# Patient Record
Sex: MALE | Race: WHITE | URBAN - METROPOLITAN AREA
[De-identification: names, ages, dates, MRNs, and addresses within clinical notes are randomized per-mention and may not be internally consistent; named-entity substitution may affect disease eponyms.]

---

## 2017-03-20 ENCOUNTER — OP HISTORICAL/CONVERTED ENCOUNTER (OUTPATIENT)
Dept: OTHER | Age: 47
End: 2017-03-20

## 2024-12-26 PROBLEM — R39.198 DIFFICULTY VOIDING: Status: ACTIVE | Noted: 2024-12-26

## 2025-01-30 ENCOUNTER — OFFICE VISIT (OUTPATIENT)
Age: 55
End: 2025-01-30
Payer: COMMERCIAL

## 2025-01-30 VITALS — HEART RATE: 62 BPM | HEIGHT: 69 IN | SYSTOLIC BLOOD PRESSURE: 142 MMHG | DIASTOLIC BLOOD PRESSURE: 88 MMHG

## 2025-01-30 DIAGNOSIS — R39.198 DIFFICULTY VOIDING: Primary | ICD-10-CM

## 2025-01-30 LAB
BILIRUBIN, URINE, POC: NEGATIVE
BLOOD URINE, POC: NEGATIVE
GLUCOSE URINE, POC: NEGATIVE
KETONES, URINE, POC: NEGATIVE
LEUKOCYTE ESTERASE, URINE, POC: NEGATIVE
NITRITE, URINE, POC: NEGATIVE
PH, URINE, POC: 7 (ref 4.6–8)
PROTEIN,URINE, POC: NEGATIVE
SPECIFIC GRAVITY, URINE, POC: 1.02 (ref 1–1.03)
URINALYSIS CLARITY, POC: CLEAR
URINALYSIS COLOR, POC: YELLOW
UROBILINOGEN, POC: NORMAL

## 2025-01-30 PROCEDURE — 99204 OFFICE O/P NEW MOD 45 MIN: CPT | Performed by: STUDENT IN AN ORGANIZED HEALTH CARE EDUCATION/TRAINING PROGRAM

## 2025-01-30 PROCEDURE — 81003 URINALYSIS AUTO W/O SCOPE: CPT | Performed by: STUDENT IN AN ORGANIZED HEALTH CARE EDUCATION/TRAINING PROGRAM

## 2025-01-30 RX ORDER — FAMOTIDINE 40 MG/1
40 TABLET, FILM COATED ORAL DAILY
COMMUNITY

## 2025-01-30 RX ORDER — FLUTICASONE PROPIONATE 50 MCG
1 SPRAY, SUSPENSION (ML) NASAL 2 TIMES DAILY PRN
COMMUNITY

## 2025-01-30 RX ORDER — POLYETHYLENE GLYCOL 3350 17 G/17G
17 POWDER, FOR SOLUTION ORAL DAILY
COMMUNITY

## 2025-01-30 RX ORDER — CALCIUM CARBONATE 500 MG/1
TABLET, CHEWABLE ORAL 4 TIMES DAILY PRN
COMMUNITY

## 2025-01-30 RX ORDER — CLOBETASOL PROPIONATE 0.5 MG/G
OINTMENT TOPICAL 2 TIMES DAILY PRN
COMMUNITY

## 2025-01-30 RX ORDER — ACETAMINOPHEN 325 MG/1
TABLET ORAL EVERY 6 HOURS PRN
COMMUNITY

## 2025-01-30 RX ORDER — LORATADINE 10 MG/1
10 TABLET ORAL DAILY
COMMUNITY

## 2025-01-30 RX ORDER — OXYBUTYNIN CHLORIDE 5 MG/1
5 TABLET, EXTENDED RELEASE ORAL DAILY
Qty: 30 TABLET | Refills: 3 | Status: SHIPPED | OUTPATIENT
Start: 2025-01-30

## 2025-01-30 RX ORDER — IBUPROFEN 200 MG
600 TABLET ORAL EVERY 6 HOURS PRN
COMMUNITY

## 2025-01-30 RX ORDER — SUMATRIPTAN SUCCINATE 100 MG/1
100 TABLET ORAL
COMMUNITY

## 2025-01-30 NOTE — ASSESSMENT & PLAN NOTE
Chronic, not at goal.  Tried Flomax previously without success.  Patient most bothered by frequency and urgency.  We will try medication for overactive bladder.  Use 5 mg oxybutynin once daily in the morning.  Follow-up in 6 to 8 weeks to review symptoms.

## 2025-01-30 NOTE — PROGRESS NOTES
Chief Complaint   Patient presents with    New Patient     Urinary frequency      1. Have you been to the ER, urgent care clinic since your last visit?  Hospitalized since your last visit?No    2. Have you seen or consulted any other health care providers outside of the [facility] since your last visit?  Include any pap smears or colon screening. No

## 2025-01-30 NOTE — PROGRESS NOTES
HISTORY OF PRESENT ILLNESS  Zacarias Huang is a 54 y.o. male.  Chief Complaint   Patient presents with    New Patient     Urinary frequency        54-year-old male referred for lower urinary tract symptoms.  He states that he has both weak stream but has tried Flomax in the past without success.  He is also bothered by frequency and urgency of urination which may indicate an aspect of overactive bladder.  He is not currently interested in undergoing a cystoscopy due to fear of pain.  No other urinary issues in the past.  PSA is within normal limits.        PAST MEDICAL HISTORY:  PMHx (including negatives):  has no past medical history on file.   PSurgHx:  has no past surgical history on file.  PSocHx:  reports that he has quit smoking. His smoking use included cigarettes. He has never used smokeless tobacco. He reports that he does not currently use alcohol. He reports that he does not currently use drugs after having used the following drugs: Marijuana (Weed).     Review of Systems      Physical Exam  Constitutional:       General: He is not in acute distress.     Appearance: Normal appearance. He is not ill-appearing.   HENT:      Head: Normocephalic and atraumatic.      Nose: Nose normal.   Eyes:      Extraocular Movements: Extraocular movements intact.      Pupils: Pupils are equal, round, and reactive to light.   Pulmonary:      Effort: Pulmonary effort is normal. No respiratory distress.   Abdominal:      General: There is no distension.   Musculoskeletal:         General: No deformity.      Cervical back: Normal range of motion.   Skin:     Coloration: Skin is not jaundiced or pale.   Neurological:      General: No focal deficit present.      Mental Status: He is alert and oriented to person, place, and time.   Psychiatric:         Mood and Affect: Mood normal.         ASSESSMENT AND PLAN      1. Difficulty voiding  Overview:  He has been on Flomax with no improvement in symptoms.  Assessment &

## 2025-04-21 PROBLEM — N32.81 OVERACTIVE BLADDER: Status: ACTIVE | Noted: 2024-12-26

## 2025-04-24 ENCOUNTER — OFFICE VISIT (OUTPATIENT)
Age: 55
End: 2025-04-24
Payer: COMMERCIAL

## 2025-04-24 VITALS — HEART RATE: 66 BPM | DIASTOLIC BLOOD PRESSURE: 83 MMHG | SYSTOLIC BLOOD PRESSURE: 130 MMHG | HEIGHT: 69 IN

## 2025-04-24 DIAGNOSIS — N32.81 OVERACTIVE BLADDER: Primary | ICD-10-CM

## 2025-04-24 PROCEDURE — 99214 OFFICE O/P EST MOD 30 MIN: CPT | Performed by: STUDENT IN AN ORGANIZED HEALTH CARE EDUCATION/TRAINING PROGRAM

## 2025-04-24 RX ORDER — PREGABALIN 50 MG/1
50 CAPSULE ORAL DAILY
COMMUNITY

## 2025-04-24 NOTE — PROGRESS NOTES
Chief Complaint   Patient presents with    New Patient     Overactive Bladder     1. Have you been to the ER, urgent care clinic since your last visit?  Hospitalized since your last visit?No    2. Have you seen or consulted any other health care providers outside of the [facility] since your last visit?  Include any pap smears or colon screening. No  /83 (BP Site: Left Upper Arm)   Pulse 66   Ht 1.753 m (5' 9\")

## 2025-04-24 NOTE — PROGRESS NOTES
HISTORY OF PRESENT ILLNESS    History of Present Illness  The patient is a 54-year-old male with a history of urinary urgency and frequency. He previously tried Flomax, which was unsuccessful. At the last visit, he was started on 5 mg of oxybutynin. He follows up today to review his symptoms.    He reports persistent urinary leakage despite the use of oxybutynin, which he finds ineffective. He experiences frequent urination, necessitating bathroom visits approximately 5 times per night. He has not undergone any bladder examinations or catheterizations in the past. He also mentions that his allergy medication induces dry mouth, compelling him to consume water. Post-urination, he does not experience any burning sensation but continues to leak urine.    MEDICATIONS  Current: Oxybutynin.  Past: Flomax.    PAST MEDICAL HISTORY:  PMHx (including negatives):  has no past medical history on file.   PSurgHx:  has no past surgical history on file.  PSocHx:  reports that he has quit smoking. His smoking use included cigarettes. He has never used smokeless tobacco. He reports that he does not currently use alcohol. He reports that he does not currently use drugs after having used the following drugs: Marijuana (Weed).     PHYSICAL EXAM:  General: Well developed, no acute distress  CV: Well perfused, regular rate  Lungs: Normal work of breathing  Abdomen: Soft, Non-distended    ASSESSMENT AND PLAN    Assessment & Plan  1. Urinary urgency and frequency.  He reports persistent symptoms of urinary urgency and frequency, including leakage after urination and frequent nighttime urination, despite taking 5 mg of oxybutynin. Continue Oxybutynin. A cystoscopy with CMG, Uroflow and PVR is recommended to further evaluate his condition. The potential risks and benefits of the procedure have been discussed.    Please call [facility] to schedule a cystoscopy.    Lee Guzman MD    Please note that portions of this note were

## 2025-05-19 PROBLEM — R39.9 LOWER URINARY TRACT SYMPTOMS (LUTS): Status: ACTIVE | Noted: 2025-05-19

## 2025-05-22 ENCOUNTER — PROCEDURE VISIT (OUTPATIENT)
Age: 55
End: 2025-05-22
Payer: COMMERCIAL

## 2025-05-22 VITALS
OXYGEN SATURATION: 98 % | HEIGHT: 69 IN | SYSTOLIC BLOOD PRESSURE: 117 MMHG | DIASTOLIC BLOOD PRESSURE: 82 MMHG | HEART RATE: 64 BPM

## 2025-05-22 DIAGNOSIS — R39.9 LOWER URINARY TRACT SYMPTOMS (LUTS): Primary | ICD-10-CM

## 2025-05-22 PROCEDURE — 99214 OFFICE O/P EST MOD 30 MIN: CPT | Performed by: STUDENT IN AN ORGANIZED HEALTH CARE EDUCATION/TRAINING PROGRAM

## 2025-05-22 PROCEDURE — 52000 CYSTOURETHROSCOPY: CPT | Performed by: STUDENT IN AN ORGANIZED HEALTH CARE EDUCATION/TRAINING PROGRAM

## 2025-05-22 RX ORDER — MIRABEGRON 50 MG/1
50 TABLET, FILM COATED, EXTENDED RELEASE ORAL DAILY
Qty: 30 TABLET | Refills: 2 | Status: SHIPPED | OUTPATIENT
Start: 2025-05-22 | End: 2025-08-20

## 2025-05-22 NOTE — PROGRESS NOTES
Chief Complaint   Patient presents with    Procedure    Cystoscopy     1. Have you been to the ER, urgent care clinic since your last visit?  Hospitalized since your last visit?No    2. Have you seen or consulted any other health care providers outside of the [facility] since your last visit?  Include any pap smears or colon screening. No    /82   Pulse 64   Ht 1.753 m (5' 9\")   SpO2 98%     
Cystoscopy - office    Patient presented for cystoscopy.      He was placed supine on the procedure table and his genitals were prepped and with chlorhexidine and Urojet.  Using 16 Hungarian flexible cystoscope cystourethroscopy was performed.    The urethra was patent without stricturing.    He was unable to tolerate passage of the scope through the urethral sphincter. The scope was then removed.     Impression: Normal urethra. Unable to evaluate prostatic urethra or bladder due to patient discomfort.   
syntax, homophones, and other interpretive errors are inadvertently transcribed by the computer softwares.  Please disregard these errors and any other errors that may have escaped final proofreading.The patient (or guardian, if applicable) and other individuals in attendance with the patient were advised that Artificial Intelligence will be utilized during this visit to record, process the conversation to generate a clinical note, and support improvement of the AI technology. The patient (or guardian, if applicable) and other individuals in attendance at the appointment consented to the use of AI, including the recording.  Thank you.